# Patient Record
Sex: MALE | Race: OTHER
[De-identification: names, ages, dates, MRNs, and addresses within clinical notes are randomized per-mention and may not be internally consistent; named-entity substitution may affect disease eponyms.]

---

## 2019-02-05 VITALS — DIASTOLIC BLOOD PRESSURE: 93 MMHG | SYSTOLIC BLOOD PRESSURE: 135 MMHG

## 2019-02-06 ENCOUNTER — HOSPITAL ENCOUNTER (INPATIENT)
Dept: HOSPITAL 72 - SDSOVERFLO | Age: 59
LOS: 1 days | Discharge: HOME | DRG: 708 | End: 2019-02-07
Payer: SELF-PAY

## 2019-02-06 VITALS — DIASTOLIC BLOOD PRESSURE: 84 MMHG | SYSTOLIC BLOOD PRESSURE: 125 MMHG

## 2019-02-06 VITALS — DIASTOLIC BLOOD PRESSURE: 66 MMHG | SYSTOLIC BLOOD PRESSURE: 103 MMHG

## 2019-02-06 VITALS — DIASTOLIC BLOOD PRESSURE: 66 MMHG | SYSTOLIC BLOOD PRESSURE: 104 MMHG

## 2019-02-06 VITALS — DIASTOLIC BLOOD PRESSURE: 71 MMHG | SYSTOLIC BLOOD PRESSURE: 110 MMHG

## 2019-02-06 VITALS — DIASTOLIC BLOOD PRESSURE: 73 MMHG | SYSTOLIC BLOOD PRESSURE: 114 MMHG

## 2019-02-06 VITALS — DIASTOLIC BLOOD PRESSURE: 68 MMHG | SYSTOLIC BLOOD PRESSURE: 111 MMHG

## 2019-02-06 VITALS — SYSTOLIC BLOOD PRESSURE: 117 MMHG | DIASTOLIC BLOOD PRESSURE: 55 MMHG

## 2019-02-06 VITALS — WEIGHT: 244 LBS | BODY MASS INDEX: 34.93 KG/M2 | HEIGHT: 70 IN

## 2019-02-06 VITALS — SYSTOLIC BLOOD PRESSURE: 123 MMHG | DIASTOLIC BLOOD PRESSURE: 81 MMHG

## 2019-02-06 VITALS — SYSTOLIC BLOOD PRESSURE: 125 MMHG | DIASTOLIC BLOOD PRESSURE: 67 MMHG

## 2019-02-06 VITALS — DIASTOLIC BLOOD PRESSURE: 62 MMHG | SYSTOLIC BLOOD PRESSURE: 115 MMHG

## 2019-02-06 VITALS — SYSTOLIC BLOOD PRESSURE: 116 MMHG | DIASTOLIC BLOOD PRESSURE: 69 MMHG

## 2019-02-06 VITALS — SYSTOLIC BLOOD PRESSURE: 110 MMHG | DIASTOLIC BLOOD PRESSURE: 66 MMHG

## 2019-02-06 DIAGNOSIS — I10: ICD-10-CM

## 2019-02-06 DIAGNOSIS — E78.5: ICD-10-CM

## 2019-02-06 DIAGNOSIS — E66.9: ICD-10-CM

## 2019-02-06 DIAGNOSIS — C61: Primary | ICD-10-CM

## 2019-02-06 LAB
ADD MANUAL DIFF: YES
ANION GAP SERPL CALC-SCNC: 7 MMOL/L (ref 5–15)
BUN SERPL-MCNC: 14 MG/DL (ref 7–18)
CALCIUM SERPL-MCNC: 7.8 MG/DL (ref 8.5–10.1)
CHLORIDE SERPL-SCNC: 103 MMOL/L (ref 98–107)
CO2 SERPL-SCNC: 26 MMOL/L (ref 21–32)
CREAT SERPL-MCNC: 1.1 MG/DL (ref 0.55–1.3)
ERYTHROCYTE [DISTWIDTH] IN BLOOD BY AUTOMATED COUNT: 11.9 % (ref 11.6–14.8)
HCT VFR BLD CALC: 39.6 % (ref 42–52)
HGB BLD-MCNC: 13.4 G/DL (ref 14.2–18)
MCV RBC AUTO: 94 FL (ref 80–99)
PLATELET # BLD: 155 K/UL (ref 150–450)
POTASSIUM SERPL-SCNC: 4.6 MMOL/L (ref 3.5–5.1)
RBC # BLD AUTO: 4.23 M/UL (ref 4.7–6.1)
SODIUM SERPL-SCNC: 136 MMOL/L (ref 136–145)
WBC # BLD AUTO: 9.6 K/UL (ref 4.8–10.8)

## 2019-02-06 PROCEDURE — 0VT04ZZ RESECTION OF PROSTATE, PERCUTANEOUS ENDOSCOPIC APPROACH: ICD-10-PCS

## 2019-02-06 PROCEDURE — 85007 BL SMEAR W/DIFF WBC COUNT: CPT

## 2019-02-06 PROCEDURE — 80048 BASIC METABOLIC PNL TOTAL CA: CPT

## 2019-02-06 PROCEDURE — 85025 COMPLETE CBC W/AUTO DIFF WBC: CPT

## 2019-02-06 PROCEDURE — 86900 BLOOD TYPING SEROLOGIC ABO: CPT

## 2019-02-06 PROCEDURE — 94003 VENT MGMT INPAT SUBQ DAY: CPT

## 2019-02-06 PROCEDURE — 86901 BLOOD TYPING SEROLOGIC RH(D): CPT

## 2019-02-06 PROCEDURE — 94150 VITAL CAPACITY TEST: CPT

## 2019-02-06 PROCEDURE — 36415 COLL VENOUS BLD VENIPUNCTURE: CPT

## 2019-02-06 PROCEDURE — 86850 RBC ANTIBODY SCREEN: CPT

## 2019-02-06 PROCEDURE — 87081 CULTURE SCREEN ONLY: CPT

## 2019-02-06 PROCEDURE — 07TC4ZZ RESECTION OF PELVIS LYMPHATIC, PERCUTANEOUS ENDOSCOPIC APPROACH: ICD-10-PCS

## 2019-02-06 RX ADMIN — DEXTROSE, SODIUM CHLORIDE, AND POTASSIUM CHLORIDE SCH MLS/HR: 5; .45; .15 INJECTION INTRAVENOUS at 23:01

## 2019-02-06 RX ADMIN — KETOROLAC TROMETHAMINE SCH MG: 30 INJECTION, SOLUTION INTRAMUSCULAR; INTRAVENOUS at 18:55

## 2019-02-06 RX ADMIN — KETOROLAC TROMETHAMINE SCH MG: 30 INJECTION, SOLUTION INTRAMUSCULAR; INTRAVENOUS at 13:27

## 2019-02-06 RX ADMIN — DEXTROSE, SODIUM CHLORIDE, AND POTASSIUM CHLORIDE SCH MLS/HR: 5; .45; .15 INJECTION INTRAVENOUS at 13:26

## 2019-02-06 RX ADMIN — CEFAZOLIN SODIUM SCH MLS/HR: 1 SOLUTION INTRAVENOUS at 17:50

## 2019-02-06 RX ADMIN — DOCUSATE SODIUM SCH MG: 100 CAPSULE, LIQUID FILLED ORAL at 17:50

## 2019-02-06 NOTE — BRIEF OPERATIVE NOTE
Immediate Post Operative Note


Operative Note


Pre-op Diagnosis:


prostate cancer


Procedure:


laparoscopic radical prostatectomy with bilateral PLND


Post-op Diagnosis:


same


Post-op Diagnosis:  same as pre-op


Surgeon:  Willie Thomas


Assistant:  Jc Marte


Anesthesia:  general


Specimen:  yes


Complications:  none


Condition:  stable


Fluids:  1000


Estimated Blood Loss:  minimal


Drains:  none


Implant(s) used?:  No











Solomon Thomas MD Feb 6, 2019 10:48

## 2019-02-06 NOTE — PRE-PROCEDURE NOTE/ATTESTATION
Pre-Procedure Note/Attestation


Complete Prior to Procedure


Planned Procedure:  not applicable


Procedure Narrative:


laparoscopic radical prostatectomy





Indications for Procedure


Pre-Operative Diagnosis:


prostate cancer





Attestation


I attest that I discussed the nature of the procedure; its benefits; risks and 

complications; and alternatives (and the risks and benefits of such alternatives

), prior to the procedure, with the patient (or the patient's legal 

representative).





I attest that, if there was a reasonable possibility of needing a blood 

transfusion, the patient (or the patient's legal representative) was given the 

[facility] standardized written summary, pursuant 

to the [place] Blood Safety Act ([place] Health and Safety Code # 1645, as 

amended).





I attest that I re-evaluated the patient just prior to the surgery and that 

there has been no change in the patient's H&P, except as documented below:











Solomon Thomas MD Feb 6, 2019 10:46

## 2019-02-06 NOTE — IMMEDIATE POST-OP EVALUATION
Immediate Post-Op Evalulation


Immediate Post-Op Evalulation


Procedure:  Laproscopic Radical Prostatectomy


Date of Evaluation:  2019


Time of Evaluation:  11:08


IV Fluids:  3000 LR


Blood Products:  0


Estimated Blood Loss:  200


Urinary Output:  300


Blood Pressure Systolic:  94


Blood Pressure Diastolic:  61


Pulse Rate:  55


Respiratory Rate:  16


O2 Sat by Pulse Oximetry:  100


Temperature (Fahrenheit):  97.9


Pain Score (1-10):  2


Nausea:  No


Vomiting:  No


Complications


0


Patient Status:  awake, reacts, patent, extubated, none


Hydration Status:  adequate


Dru Grams Ancef IV


Given Within 1 Hr of Incision:  Yes


Time Given:  07:51











Rehan Celeste MD 2019 07:21

## 2019-02-06 NOTE — ANETHESIA PREOPERATIVE EVAL
Anesthesia Pre-op PMH/ROS


General


Date of Evaluation:  2019


Time of Evaluation:  07:41


Anesthesiologist:  Booker


ASA Score:  ASA 3


Mallampati Score


Class I : Soft palate, uvula, fauces, pillars visible


Class II: Soft palate, uvula, fauces visible


Class III: Soft palate, base of uvula visible


Class IV: Only hard plate visible


Mallampati Classification:  Class II


Surgeon:  Martha


Diagnosis:  Prostate CA


Surgical Procedure:  Laproscopic Radical Prostatectomy


Anesthesia History:  none


Family History:  no anesthesia problems


Allergies:  


Coded Allergies:  


     No Known Allergies (Unverified , 19)


Medications:  see eMAR


Patient NPO?:  Yes





Past Medical History


Cardiovascular:  Reports: HTN, other - HL


Gastrointestinal/Genitourinary:  Reports: other - ProstateCA


Other:  obesity - BMI 37





Anesthesia Pre-op Phys. Exam


Physician Exam





Last Vital Signs








  Date Time  Temp Pulse Resp B/P (MAP) Pulse Ox O2 Delivery O2 Flow Rate FiO2


 


19 10:15 97.0 60 18 135/93 (107) 98   








Constitutional:  NAD


Neurologic:  CN 2-12 intact


Cardiovascular:  RRR


Respiratory:  CTA


Gastrointestinal:  S/NT/ND





Airway Exam


Mallampati Score:  Class II


MO:  full


ROM:  limited


Teeth:  missing, intact





Anesthesia Pre-op A/P


Risk Assessment & Plan


Assessment:


ASA 3


Plan:


GA, SED, GlideScope Go


Status Change Before Surgery:  No





Pre-Antibiotics


Dru Grams Ancef IV


Given Within 1 Hr of Incision:  Yes


Time Given:  07:51











Rehan Celeste MD 2019 06:27

## 2019-02-07 VITALS — SYSTOLIC BLOOD PRESSURE: 139 MMHG | DIASTOLIC BLOOD PRESSURE: 89 MMHG

## 2019-02-07 VITALS — SYSTOLIC BLOOD PRESSURE: 99 MMHG | DIASTOLIC BLOOD PRESSURE: 53 MMHG

## 2019-02-07 VITALS — SYSTOLIC BLOOD PRESSURE: 111 MMHG | DIASTOLIC BLOOD PRESSURE: 72 MMHG

## 2019-02-07 VITALS — DIASTOLIC BLOOD PRESSURE: 70 MMHG | SYSTOLIC BLOOD PRESSURE: 120 MMHG

## 2019-02-07 VITALS — SYSTOLIC BLOOD PRESSURE: 123 MMHG | DIASTOLIC BLOOD PRESSURE: 73 MMHG

## 2019-02-07 LAB
ADD MANUAL DIFF: NO
ANION GAP SERPL CALC-SCNC: 6 MMOL/L (ref 5–15)
BASOPHILS NFR BLD AUTO: 0.1 % (ref 0–2)
BUN SERPL-MCNC: 14 MG/DL (ref 7–18)
CALCIUM SERPL-MCNC: 7.9 MG/DL (ref 8.5–10.1)
CHLORIDE SERPL-SCNC: 104 MMOL/L (ref 98–107)
CO2 SERPL-SCNC: 28 MMOL/L (ref 21–32)
CREAT SERPL-MCNC: 1 MG/DL (ref 0.55–1.3)
EOSINOPHIL NFR BLD AUTO: 0 % (ref 0–3)
ERYTHROCYTE [DISTWIDTH] IN BLOOD BY AUTOMATED COUNT: 11.7 % (ref 11.6–14.8)
HCT VFR BLD CALC: 35 % (ref 42–52)
HGB BLD-MCNC: 12.1 G/DL (ref 14.2–18)
LYMPHOCYTES NFR BLD AUTO: 15.8 % (ref 20–45)
MCV RBC AUTO: 93 FL (ref 80–99)
MONOCYTES NFR BLD AUTO: 11.6 % (ref 1–10)
NEUTROPHILS NFR BLD AUTO: 72.4 % (ref 45–75)
PLATELET # BLD: 124 K/UL (ref 150–450)
POTASSIUM SERPL-SCNC: 4.3 MMOL/L (ref 3.5–5.1)
RBC # BLD AUTO: 3.78 M/UL (ref 4.7–6.1)
SODIUM SERPL-SCNC: 138 MMOL/L (ref 136–145)
WBC # BLD AUTO: 7.8 K/UL (ref 4.8–10.8)

## 2019-02-07 RX ADMIN — CEFAZOLIN SODIUM SCH MLS/HR: 1 SOLUTION INTRAVENOUS at 01:02

## 2019-02-07 RX ADMIN — DEXTROSE, SODIUM CHLORIDE, AND POTASSIUM CHLORIDE SCH MLS/HR: 5; .45; .15 INJECTION INTRAVENOUS at 09:00

## 2019-02-07 RX ADMIN — DOCUSATE SODIUM SCH MG: 100 CAPSULE, LIQUID FILLED ORAL at 09:19

## 2019-02-07 RX ADMIN — KETOROLAC TROMETHAMINE SCH MG: 30 INJECTION, SOLUTION INTRAMUSCULAR; INTRAVENOUS at 01:02

## 2019-02-07 RX ADMIN — KETOROLAC TROMETHAMINE SCH MG: 30 INJECTION, SOLUTION INTRAMUSCULAR; INTRAVENOUS at 06:21

## 2019-02-07 NOTE — 48 HOUR POST ANESTHESIA EVAL
Post Anesthesia Evaluation


Procedure:  Laproscopic Radical Prostatectomy


Date of Evaluation:  Feb 7, 2019


Time of Evaluation:  07:00


Blood Pressure Systolic:  120


0:  70


Pulse Rate:  70


Respiratory Rate:  19


Temperature (Fahrenheit):  98.1


O2 Sat by Pulse Oximetry:  96


Airway:  patent


Nausea:  No


Vomiting:  No


Pain Intensity:  2


Hydration Status:  adequate


Cardiopulmonary Status:


at basaeline


Mental Status/LOC:  patient returned to baseline


Post-Anesthesia Complications:


0


Follow-up care needed:  N/A - further care as per primary team











Ashleigh Brooks MD Feb 7, 2019 09:28

## 2019-02-08 NOTE — DISCHARGE SUMMARY
Discharge Summary


Discharge Summary


_


DATE OF ADMISSION: 02/06/2019


DATE OF DISCHARGE: 02/07/2019





DISCHARGED BY: Dr. Solomon Thomas





BRIEF HOSPITAL COURSE:


Patient is a 58-year-old male, with elevated PSA and was diagnosed with 

prostate CA.  He was admitted on 02/06/2019 and underwent laparoscopic radical 

prostatectomy with bilateral lymph node dissection.  Postoperatively he was 

admitted for postop care.  He was given IV hydration.  He was given pain 

management.  He was seen by physical therapy and was encouraged ambulation.  

Diet was advanced.





Following day, vital signs were stable.  Blood work was stable.  IV fluid was 

discontinued Moore catheter was connected to a leg bag.  He was eventually 

cleared for discharge home.








FINAL DIAGNOSES: 


Prostate CA status post laparoscopic radical prostatectomy with lymph node 

dissection





DISPOSITION: Patient was discharged home.





DISCHARGE INSTRUCTIONS: Follow-up in a week.








I have been assigned to complete a discharge summary on this account, I was not 

involved with the patient's management.











Crista Simons NP Feb 8, 2019 15:27

## 2019-02-08 NOTE — OPERATIVE NOTE - DICTATED
DATE OF OPERATION:  02/06/2019

PREOPERATIVE DIAGNOSIS:  Multifocal prostate cancer.



POSTOPERATIVE DIAGNOSIS:  Multifocal prostate cancer.



OPERATION:  Laparoscopic radical retropubic prostatectomy with bilateral

pelvic lymph node dissection.



:  Solomon Thomas M.D.



ASSISTANT:  Jc Marte M.D.



FINDINGS:  Enlarged prostate, prostate cancer.



INDICATION FOR SURGERY:  The patient came to me with elevated PSA.  4K test

as well showed high risk for prostate cancer.  The patient underwent

multifocal prostate ultrasound-guided biopsy that showed multifocal

[place] 8 to 9 prostate cancer.  His metastatic workup was negative

including bone scan and the CT scan.  Treatment options were explained to

the patient in length including all potential complications.  _____ was an

.  All the issues were discussed with her as well.  He elected

to have the surgery.



OPERATIVE PROCEDURE:  The patient was brought to the operating room, placed

in supine position, prepped and draped in standard fashion.  Under general

anesthesia, Veress needle was placed.  Pneumoperitoneum was created to 15

mmHg.  After that five trocars, three 12s and two 5s were placed in a

standard position.  Dissection started with vesicle dissection.  Seminal

vesicles and vas deferens was cleared and severed.  After dissection

carried anteriorly  the bladder from the pubis.  Endopelvic

fascia was opened with Harmonic Scalpel.  The dural venous complex was

transected with an Endo JASWINDER.  Bladder neck sparing technique was used

 bladder from the prostate.  All the bladder and the prostate

margins were clear.  After that prostate was removed preserving partially

right neurovascular bundle.  The left neurovascular bundles _____.  The

urethra was also carefully preserved instead from the apex of the

prostate.  After the prostate was removed, anastomosis was created over

20-[place] Moore catheter with a running 2-0 Monocryl.  It was tested with

irrigation and watertight.  Estimated blood loss was approximately 50 mL.

After that prostate was removed and bilateral pelvic lymph node dissection

was performed preserving both obturator nerves and pelvic nodes were

placed in the specimen together and sent for pathological examination.

Trocars were removed and the wound was closed in multiple layers.  Staples

for the skin.









  ______________________________________________

  Solomon Thomas M.D.





DR:  EFRAÍN

D:  02/08/2019 15:09

T:  02/08/2019 20:30

JOB#:  [id number]

CC: